# Patient Record
Sex: FEMALE | URBAN - METROPOLITAN AREA
[De-identification: names, ages, dates, MRNs, and addresses within clinical notes are randomized per-mention and may not be internally consistent; named-entity substitution may affect disease eponyms.]

---

## 2022-09-13 ENCOUNTER — OFFICE VISIT (OUTPATIENT)
Dept: CARDIOLOGY CLINIC | Age: 32
End: 2022-09-13
Payer: COMMERCIAL

## 2022-09-13 VITALS
SYSTOLIC BLOOD PRESSURE: 101 MMHG | TEMPERATURE: 98.8 F | DIASTOLIC BLOOD PRESSURE: 63 MMHG | WEIGHT: 131 LBS | RESPIRATION RATE: 16 BRPM | HEART RATE: 104 BPM | OXYGEN SATURATION: 98 %

## 2022-09-13 DIAGNOSIS — R06.09 DOE (DYSPNEA ON EXERTION): ICD-10-CM

## 2022-09-13 DIAGNOSIS — R00.0 TACHYCARDIA: Primary | ICD-10-CM

## 2022-09-13 PROCEDURE — 93000 ELECTROCARDIOGRAM COMPLETE: CPT | Performed by: INTERNAL MEDICINE

## 2022-09-13 PROCEDURE — 99204 OFFICE O/P NEW MOD 45 MIN: CPT | Performed by: INTERNAL MEDICINE

## 2022-09-13 RX ORDER — LANOLIN ALCOHOL/MO/W.PET/CERES
CREAM (GRAM) TOPICAL
COMMUNITY
Start: 2022-09-08

## 2022-09-13 RX ORDER — SERTRALINE HYDROCHLORIDE 50 MG/1
TABLET, FILM COATED ORAL
COMMUNITY
Start: 2022-06-13

## 2022-09-13 NOTE — PROGRESS NOTES
Identified pt with two pt identifiers(name and ). Reviewed record in preparation for visit and have obtained necessary documentation. Dontae Smart presents today for   Chief Complaint   Patient presents with    New Patient     tachycardia       Pt c/o DIZZINESS, SOB             Corina T Daphne Sport preferred language for health care discussion is english/other. Personal Protective Equipment:   Personal Protective Equipment was used including: mask-surgical and hands-gloves. Patient was placed on no precaution(s). Patient was masked. Precautions:   Patient currently on None  Patient currently roomed with door closed. Is someone accompanying this pt? no    Is the patient using any DME equipment during OV? no    Depression Screening:  refused    Learning Assessment:  No flowsheet data found. Abuse Screening:  Completed    Fall Risk  Completed    Pt currently taking Anticoagulant therapy? no  Pt currently taking Antiplatelet therapy? no    Coordination of Care:  1. Have you been to the ER, urgent care clinic since your last visit? Hospitalized since your last visit? no    2. Have you seen or consulted any other health care providers outside of the [address] [place] [address] since your last visit? Include any pap smears or colon screening. [facility]      Please see Red banners under Allergies and Med Rec to remove outside inquires. All correct information has been verified with patient and added to chart.      Medication's patient's would liked removed has been marked not taking to be removed per Verbal order and read back per Diogenes Mcdaniel MD

## 2022-09-13 NOTE — PATIENT INSTRUCTIONS
Testing   Echo**call office 3-5 days after testing is completed for results**       Other Testing    Biotel( 14 days) -will be calling you to confirm your address to send your Heart Monitor. Please allow 7-10 business days for monitor to arrive at your home.   Customer Service for [address]:  [phone]

## 2022-09-13 NOTE — PROGRESS NOTES
[facility]    Ms. Rosana Alarcon is a 24-year-old female with anemia    Patient is here today for cardiac evaluation. She denies prior history of MI or CHF  Patient is 29 weeks pregnant. Patient has noticed some rapid heartbeat by her apple watch and she was asked to come see me. This is her second pregnancy. She did not have any tachycardia during first pregnancy. She does have anemia and she takes iron pills. She feels like her heart is racing occasionally. At times even at rest, heart rate has been as high as 120 bpm according to apple watch. During this time sometimes she feels little short of breath, palpitation. Some dizziness on and off but no presyncope or syncope. Patient denies any symptoms concerning for angina or heart failure. No significant edema. Denies any nausea, vomiting, abdominal pain, fever, chills, sputum production. No hematuria or other bleeding complaints    Past Medical History:   Diagnosis Date    ADHD     Anemia     Gestational diabetes        Review of Systems:  Cardiac symptoms as noted above in HPI. All others negative. Denies fatigue, malaise, skin rash, joint pain, blurring vision, photophobia, neck pain, hemoptysis, chronic cough, nausea, vomiting, hematuria, burning micturition, BRBPR, chronic headaches. Current Outpatient Medications   Medication Sig    ferrous sulfate 325 mg (65 mg iron) tablet     sertraline (ZOLOFT) 50 mg tablet      No current facility-administered medications for this visit. No past surgical history on file. Allergies and Sensitivities:  No Known Allergies    Family History:  No family history on file. Social History:     She  has no history on file for tobacco use. She  has no history on file for alcohol use.     Physical Exam:  BP Readings from Last 3 Encounters:   09/13/22 101/63         Pulse Readings from Last 3 Encounters:   09/13/22 (!) 104          Wt Readings from Last 3 Encounters:   22 59.4 kg (131 lb)       Constitutional: Oriented to person, place, and time. HENT: Head: Normocephalic and atraumatic. Eyes: Conjunctivae and extraocular motions are normal.   Neck: No JVD present. Carotid bruit is not appreciated. Cardiovascular: Regular rhythm. No murmur, gallop or rubs appreciated  Lung: Breath sounds normal. No respiratory distress. No ronchi or rales appreciated  Abdominal: No tenderness. No rebound and no guarding. Musculoskeletal: There is no lower extremity edema. No cynosis  Lymphadenopathy:  No cervical or supraclavicular adenopathy appriciated. Neurological: No gross motor deficit noted. Skin: No visible skin rash noted. No Ear discharge noted  Psychiatric: Normal mood and affect. LABS:   @No results found for: WBC, WBCLT, HGBPOC, HGB, HGBP, HCTPOC, HCT, PHCT, RBCH, PLT, MCV, HGBEXT, HCTEXT, PLTEXT  No results found for: NA, K, CL, CO2, GLU, BUN, CREA  No flowsheet data found. No results found for: ALT  No results found for: HBA1C, WZB1QMTN, JWC2MCYQ  No results found for: TSH, TSH2, TSH3, TSHP, TSHEXT    EK2022: Sinus tachycardia at 106 bpm.  No ST-T changes of ischemia. Normal CT and QRS interval.    STRESS TEST (EST, PHARM, NUC, ECHO etc)    CATHETERIZATION    IMPRESSION & PLAN:  Ms. Soo Desai is 68-year-old female    Tachycardia:  Most likely from underlying anemia, pregnancy related hormonal changes or fluid shift. She is drinking probably 120 ounce of fluid a day. No evidence of dehydration  According to patient all the lab work except anemia has been unremarkable at OB/GYN office. Assuming that thyroid has been checked symptom versus hypothyroidism  We will proceed with echocardiogram to rule out any abnormal cardiac structure  Will place event monitor for 2 weeks. Reassured patient. Would avoid any AV karl blocking agent especially with pregnancy at this time. Blood pressure is 101/63.     If persistent tachycardia after pregnancy is over, may need to consider tilt table testing. She may have underlying inappropriate sinus tachycardia as well. Importance of diet and exercise was discussed with patient. This plan was discussed with patient who is in agreement. Thank you for allowing me to participate in patient care. Please feel free to call me if you have any question or concern. Omid Mendoza MD  Please note: This document has been produced using voice recognition software. Unrecognized errors in transcription may be present.

## 2022-09-15 ENCOUNTER — TELEPHONE (OUTPATIENT)
Dept: CARDIOLOGY CLINIC | Age: 32
End: 2022-09-15

## 2022-09-19 ENCOUNTER — TELEPHONE (OUTPATIENT)
Dept: CARDIOLOGY CLINIC | Age: 32
End: 2022-09-19

## 2022-09-19 NOTE — TELEPHONE ENCOUNTER
PT in for echo and advised that it usually takes 7-10 days to get the biotel. Advised PT if she dont receive it by the 10th day to call the office. PT verbally understanding.

## 2022-09-22 ENCOUNTER — TELEPHONE (OUTPATIENT)
Dept: CARDIOLOGY CLINIC | Age: 32
End: 2022-09-22

## 2022-09-22 NOTE — TELEPHONE ENCOUNTER
----- Message from Merly Payne MD sent at 9/22/2022 11:57 AM EDT -----  Please inform patient about test result  Appears normal.    Thanks  SP [Well Developed] : well developed [Well Nourished] : well nourished [Normal S1, S2] : normal S1, S2 [Soft] : abdomen soft [Non Tender] : non-tender [No Masses/organomegaly] : no masses/organomegaly [Normal Bowel Sounds] : normal bowel sounds [Normal Gait] : normal gait [No Edema] : no edema [No Clubbing] : no clubbing [Moves all extremities] : moves all extremities [No Focal Deficits] : no focal deficits [Alert and Oriented] : alert and oriented [Normal memory] : normal memory [de-identified] : diastolic rumble with opening snap [de-identified] : R shoulder pain illicited on the empty can test, patel dayanna test at the R shoulder joint is also positive.  [de-identified] : multiple lipomas

## 2022-09-22 NOTE — TELEPHONE ENCOUNTER
Contacted pt a  number. Two patient Identifiers confirmed. Advised pt per Dr. Johnson Self. Pt verbalized understanding.

## 2022-10-20 DIAGNOSIS — R00.0 TACHYCARDIA: ICD-10-CM

## 2022-10-20 DIAGNOSIS — R06.09 DOE (DYSPNEA ON EXERTION): ICD-10-CM
